# Patient Record
(demographics unavailable — no encounter records)

---

## 2025-03-26 NOTE — IMAGING
[de-identified] : TTP midline spine and paraspinal musculature  Strength                                          Hip flexor   Right: 5/5; Left: 5/5                              Knee extensor     Right: 5/5; Left: 5/5                      Ankle dorsiflexion   Right: 5/5; Left: 5/5                   EHL           Right: 5/5; Left: 5/5                                 Ankle plantarflexion       Right: 5/5; Left: 5/5  Sensation L1   Right: 2/2; Left: 2/2 L2   Right: 2/2; Left: 2/2 L3   Right: 2/2; Left: 2/2 L4   Right: 2/2; Left: 2/2 L5   Right: 2/2; Left: 2/2 S1   Right: 2/2; Left: 2/2  Reflexes Absent reflexes

## 2025-03-26 NOTE — DISCUSSION/SUMMARY
[de-identified] : 81-year-old female with lumbar radiculopathy neurogenic claudication secondary to spinal stenosis L2 5.  The patient has failed conservative care including physical therapy and epidural steroid injections.  I am ordering an updated MRI of her lumbar spine.  I also discussed and recommended L2 5 laminectomy.  Goal of surgery is relief of radiculopathy and claudication.  Risk include infection, wound healing, CSF leak, nerve injury, paralysis, organ damage, incomplete pain relief current chronic pain, recurrent pain, blood clots, death.  Patient will need to see her cardiologist and PCP prior to surgery.

## 2025-03-26 NOTE — DATA REVIEWED
[FreeTextEntry1] : I reviewed the patient's AP lateral flexion-extension lumbar x-rays.  No instability significant to space degeneration.  MRI of the lumbar spine shows spinal stenosis fairly significant L2 L5
no

## 2025-03-26 NOTE — HISTORY OF PRESENT ILLNESS
[de-identified] : 81-year-old female presents to me with chronic low back pain.  Radiates down both of her legs.  No loss of bladder or bowel.  She has difficulty with ambulation her legs are weak heavy and tired.  When she bends forward on a shopping cart or a cane she can walk longer distances.  She has numbness and tingling in her feet.  She has had multiple rounds of epidural steroid injections and she done over 6 weeks of physical therapy nothing helps her.  She has well-controlled diabetes and her hemoglobin A1c is in the sixes according to the son.  She is on Plavix because she has had 2 coronary stents but she thinks she will be able to come off of her Plavix temporarily for surgery.  She had a recent MRI of her lumbar spine done in [place].  This is affecting the quality of life.  Her son is here helping translate.

## 2025-03-31 NOTE — HISTORY OF PRESENT ILLNESS
[FreeTextEntry1] : Patient is an 81 yr-old female, with pmhx of CAD ( of mRCA by cath in 2020; hx of RCA stent restenosis), HTN s/p R renal stent (2006?), and DM, presenting for cardiac clearance prior to laminectomy. Last visited her cardiologist in 2020. Wishes to switch care to our office. Patient is mostly Yoruba-speaking and is seen with her son as her preferred . Patient's mobility is very limited by her back pain however she denies any chest pain, shortness of breath, dizziness/syncope, palpitations, or leg swelling at this time. No recent lab work to review.

## 2025-03-31 NOTE — DISCUSSION/SUMMARY
[EKG obtained to assist in diagnosis and management of assessed problem(s)] : EKG obtained to assist in diagnosis and management of assessed problem(s) [FreeTextEntry1] : Preoperative cardiac clearance: Obtain TTE to assess cardiac function and structure. Recommend pharmacological nuclear stress testing to evaluate for progression of CAD.  CAD with  of RCA: Follow up results of nuclear stress test. Continue current medical therapy for now. Repeat lipid panel. Goal LDL <55. Weight loss with diet modification recommended.  HTN: Currently, the condition is stable. Continue current medical therapy. Low salt diet and weight loss recommended.   DM: Continue care per Dr. Rodgers.   Preoperative cardiac risk assessment pending results.  Lab requisition provided for CBC, CMP, TSH w/reflex T4, lipid panel, and HgbA1c.   Follow up after testing. Patient and her son aware of plan.

## 2025-03-31 NOTE — HISTORY OF PRESENT ILLNESS
[FreeTextEntry1] : Patient is an 81 yr-old female, with pmhx of CAD ( of mRCA by cath in 2020; hx of RCA stent restenosis), HTN s/p R renal stent (2006?), and DM, presenting for cardiac clearance prior to laminectomy. Last visited her cardiologist in 2020. Wishes to switch care to our office. Patient is mostly Bengali-speaking and is seen with her son as her preferred . Patient's mobility is very limited by her back pain however she denies any chest pain, shortness of breath, dizziness/syncope, palpitations, or leg swelling at this time. No recent lab work to review.

## 2025-05-12 NOTE — IMAGING
[de-identified] : Lumbar incision healed appropriately with exception at the most caudal portion of the wound there is some delayed wound healing.  No erythema no drainage no fluctuance.

## 2025-05-12 NOTE — DISCUSSION/SUMMARY
[de-identified] : 81-year-old female status post L1 5 laminectomy.  Doing well with resolution of radicular claudicant symptoms.  There are some delayed wound healing at the caudal portion of the incision.  I will give the patient Keflex I will see her back Friday.  We removed the top portion of her stitches and left the bottom 3 stitch present.  There is no evidence of infection there is some exudative tissue with clean wound edges at the most caudal portion.

## 2025-07-03 NOTE — ASSESSMENT
[FreeTextEntry1] : 81-year-old female who is Slovak speaking with chronic conditions of CAD s/p 2 stents on Plavix, asthma, rheumatoid arthritis on Humira, HTN, HLD, DM, GERD and s/p L4 laminectomy (04/24/2025) by Dr. Johnson is a new patient here for UTI symptoms.   Ordered Ur micro and UCx to assess for UTI.  Sent prescription for cefuroxime 500 mg BID x5 days. Will adjust if needed based on urine culture and sensitivities.  Sent prescription for phenazopyridine 200 mg PRN dysuria.   Follow up in 2 weeks.

## 2025-07-03 NOTE — HISTORY OF PRESENT ILLNESS
[FreeTextEntry1] : 81-year-old female who is [place] speaking with chronic conditions of CAD s/p 2 stents on Plavix, asthma, rheumatoid arthritis on Humira, HTN, HLD, DM, GERD and s/p L4 laminectomy (04/24/2025) by Dr. Johnson is a new patient here for UTI symptoms. She is accompanied by her son who she preferred to have assist with language translation.   She presented to the hospital on 05/14/2025 for fever and was found to have a UTI.  Hospital documents, labs, and imaging reviewed. She received meropenem in the hospital and was discharged with fosfomycin for a total 7-day course.   Lab 05/13/2025: - UA: Positive nitrite, moderate leuk - Urine micro: 2 RBC/hpf, 26 WBC/hpf - Urine culture: > 100,000 ESBL E. coli  CT images reviewed Official CT 05/14/2025 read: - KIDNEYS/URETERS: Symmetric enhancement. No hydroureteronephrosis. Right renal cysts and additional bilateral subcentimeter cortical hypodensities too small to characterize. Somewhat lobular contour of the renal cortex. - BLADDER: Bladder decompressed limiting evaluation.  Patient reports that she does not usually get UTIs. Continues to have dysuria.  Her PCP ordered urine labs and was recommended to follow-up with a urologist.  [facility] labs (06/17/2025) reviewed. - UA: Positive nitrate - Urine micro: 0-5 WBC/hpf, 0-2 RBC/hpf  Past labs ([facility]) reviewed. - 04/03/2025: UA: Positive nitrate, 3+ leuk; urine micro: 11-30 WBC/hpf, 0 RBC/hpf - 06/20/2024: UA: Negative nitrite, 2+ leuk; urine micro: 40-60 WBC/hpf, 0-2 RBC/hpf, 6-10 epithelial cells/hpf